# Patient Record
Sex: MALE | Race: WHITE | Employment: UNEMPLOYED | URBAN - METROPOLITAN AREA
[De-identification: names, ages, dates, MRNs, and addresses within clinical notes are randomized per-mention and may not be internally consistent; named-entity substitution may affect disease eponyms.]

---

## 2022-08-04 ENCOUNTER — HOSPITAL ENCOUNTER (EMERGENCY)
Age: 47
Discharge: HOME | End: 2022-08-04
Attending: EMERGENCY MEDICINE
Payer: COMMERCIAL

## 2022-08-04 ENCOUNTER — APPOINTMENT (OUTPATIENT)
Dept: GENERAL RADIOLOGY | Age: 47
End: 2022-08-04
Payer: COMMERCIAL

## 2022-08-04 VITALS
TEMPERATURE: 98.2 F | SYSTOLIC BLOOD PRESSURE: 124 MMHG | RESPIRATION RATE: 16 BRPM | DIASTOLIC BLOOD PRESSURE: 71 MMHG | OXYGEN SATURATION: 100 % | HEART RATE: 82 BPM | BODY MASS INDEX: 27.08 KG/M2 | WEIGHT: 205.25 LBS

## 2022-08-04 DIAGNOSIS — E86.0 DEHYDRATION: ICD-10-CM

## 2022-08-04 DIAGNOSIS — F17.200 CURRENT SMOKER: ICD-10-CM

## 2022-08-04 DIAGNOSIS — R53.83 MALAISE AND FATIGUE: ICD-10-CM

## 2022-08-04 DIAGNOSIS — Z20.822 COVID-19 VIRUS TEST RESULT UNKNOWN: ICD-10-CM

## 2022-08-04 DIAGNOSIS — R07.89 CHEST WALL PAIN: Primary | ICD-10-CM

## 2022-08-04 DIAGNOSIS — R53.81 MALAISE AND FATIGUE: ICD-10-CM

## 2022-08-04 LAB
A/G RATIO: 1.4 (ref 1.1–2.2)
ALBUMIN SERPL-MCNC: 4.1 G/DL (ref 3.4–5)
ALP BLD-CCNC: 63 U/L (ref 40–129)
ALT SERPL-CCNC: 24 U/L (ref 10–40)
ANION GAP SERPL CALCULATED.3IONS-SCNC: 12 MMOL/L (ref 3–16)
AST SERPL-CCNC: 18 U/L (ref 15–37)
BASOPHILS ABSOLUTE: 0 K/UL (ref 0–0.2)
BASOPHILS RELATIVE PERCENT: 0.6 %
BILIRUB SERPL-MCNC: 0.5 MG/DL (ref 0–1)
BUN BLDV-MCNC: 12 MG/DL (ref 7–20)
CALCIUM SERPL-MCNC: 9.2 MG/DL (ref 8.3–10.6)
CHLORIDE BLD-SCNC: 103 MMOL/L (ref 99–110)
CO2: 20 MMOL/L (ref 21–32)
CREAT SERPL-MCNC: 1 MG/DL (ref 0.9–1.3)
EKG ATRIAL RATE: 77 BPM
EKG DIAGNOSIS: NORMAL
EKG P AXIS: 29 DEGREES
EKG P-R INTERVAL: 126 MS
EKG Q-T INTERVAL: 352 MS
EKG QRS DURATION: 86 MS
EKG QTC CALCULATION (BAZETT): 398 MS
EKG R AXIS: 21 DEGREES
EKG T AXIS: 55 DEGREES
EKG VENTRICULAR RATE: 77 BPM
EOSINOPHILS ABSOLUTE: 0 K/UL (ref 0–0.6)
EOSINOPHILS RELATIVE PERCENT: 0.4 %
GFR AFRICAN AMERICAN: >60
GFR NON-AFRICAN AMERICAN: >60
GLUCOSE BLD-MCNC: 113 MG/DL (ref 70–99)
HCT VFR BLD CALC: 44 % (ref 40.5–52.5)
HEMOGLOBIN: 14.9 G/DL (ref 13.5–17.5)
LYMPHOCYTES ABSOLUTE: 2.5 K/UL (ref 1–5.1)
LYMPHOCYTES RELATIVE PERCENT: 35 %
MCH RBC QN AUTO: 29.4 PG (ref 26–34)
MCHC RBC AUTO-ENTMCNC: 34 G/DL (ref 31–36)
MCV RBC AUTO: 86.5 FL (ref 80–100)
MONOCYTES ABSOLUTE: 0.5 K/UL (ref 0–1.3)
MONOCYTES RELATIVE PERCENT: 7.4 %
NEUTROPHILS ABSOLUTE: 4 K/UL (ref 1.7–7.7)
NEUTROPHILS RELATIVE PERCENT: 56.6 %
PDW BLD-RTO: 13.5 % (ref 12.4–15.4)
PLATELET # BLD: 171 K/UL (ref 135–450)
PMV BLD AUTO: 9.3 FL (ref 5–10.5)
POTASSIUM REFLEX MAGNESIUM: 4 MMOL/L (ref 3.5–5.1)
RBC # BLD: 5.08 M/UL (ref 4.2–5.9)
SODIUM BLD-SCNC: 135 MMOL/L (ref 136–145)
TOTAL PROTEIN: 7.1 G/DL (ref 6.4–8.2)
TROPONIN: <0.01 NG/ML
WBC # BLD: 7 K/UL (ref 4–11)

## 2022-08-04 PROCEDURE — 71046 X-RAY EXAM CHEST 2 VIEWS: CPT

## 2022-08-04 PROCEDURE — 6370000000 HC RX 637 (ALT 250 FOR IP): Performed by: EMERGENCY MEDICINE

## 2022-08-04 PROCEDURE — 71045 X-RAY EXAM CHEST 1 VIEW: CPT

## 2022-08-04 PROCEDURE — U0003 INFECTIOUS AGENT DETECTION BY NUCLEIC ACID (DNA OR RNA); SEVERE ACUTE RESPIRATORY SYNDROME CORONAVIRUS 2 (SARS-COV-2) (CORONAVIRUS DISEASE [COVID-19]), AMPLIFIED PROBE TECHNIQUE, MAKING USE OF HIGH THROUGHPUT TECHNOLOGIES AS DESCRIBED BY CMS-2020-01-R: HCPCS

## 2022-08-04 PROCEDURE — 93005 ELECTROCARDIOGRAM TRACING: CPT | Performed by: EMERGENCY MEDICINE

## 2022-08-04 PROCEDURE — 85025 COMPLETE CBC W/AUTO DIFF WBC: CPT

## 2022-08-04 PROCEDURE — 93010 ELECTROCARDIOGRAM REPORT: CPT | Performed by: INTERNAL MEDICINE

## 2022-08-04 PROCEDURE — U0005 INFEC AGEN DETEC AMPLI PROBE: HCPCS

## 2022-08-04 PROCEDURE — 80053 COMPREHEN METABOLIC PANEL: CPT

## 2022-08-04 PROCEDURE — 84484 ASSAY OF TROPONIN QUANT: CPT

## 2022-08-04 PROCEDURE — 99285 EMERGENCY DEPT VISIT HI MDM: CPT

## 2022-08-04 RX ORDER — ACETAMINOPHEN 325 MG/1
650 TABLET ORAL EVERY 4 HOURS PRN
Qty: 60 TABLET | Refills: 1 | Status: SHIPPED | OUTPATIENT
Start: 2022-08-04

## 2022-08-04 RX ORDER — IBUPROFEN 400 MG/1
400 TABLET ORAL ONCE
Status: COMPLETED | OUTPATIENT
Start: 2022-08-04 | End: 2022-08-04

## 2022-08-04 RX ORDER — ACETAMINOPHEN 325 MG/1
650 TABLET ORAL ONCE
Status: COMPLETED | OUTPATIENT
Start: 2022-08-04 | End: 2022-08-04

## 2022-08-04 RX ORDER — IBUPROFEN 400 MG/1
400 TABLET ORAL EVERY 4 HOURS PRN
Qty: 60 TABLET | Refills: 1 | Status: SHIPPED | OUTPATIENT
Start: 2022-08-04

## 2022-08-04 RX ADMIN — ACETAMINOPHEN 650 MG: 325 TABLET ORAL at 12:08

## 2022-08-04 RX ADMIN — IBUPROFEN 400 MG: 400 TABLET, FILM COATED ORAL at 12:08

## 2022-08-04 RX ADMIN — LIDOCAINE HYDROCHLORIDE: 20 SOLUTION ORAL; TOPICAL at 12:10

## 2022-08-04 ASSESSMENT — PAIN SCALES - GENERAL: PAINLEVEL_OUTOF10: 9

## 2022-08-04 ASSESSMENT — PAIN - FUNCTIONAL ASSESSMENT: PAIN_FUNCTIONAL_ASSESSMENT: NONE - DENIES PAIN

## 2022-08-04 NOTE — ED PROVIDER NOTES
[address] [facility]      Pt Name: Mono Winslow  MRN: [id number]  [place] 1975  Date of evaluation: 8/4/2022  Provider: Nahun Corado MD    CHIEF COMPLAINT     Chest pain  HISTORY OF PRESENT ILLNESS  (Location/Symptom, Timing/Onset,Context/Setting, Quality, Duration, Modifying Factors, Severity). Note limiting factors. Chief Complaint   Patient presents with    Chest Pain     Started at 0500 today       Mono Winslow is a 41 y.o. male who presents to the emergency department secondary to concern for chest pain. He states that started around 5 AM and he thought it was indigestion. He has not taken any medication for it. It is located in his lower chest, does not radiate anywhere. He reports he has not been feeling well for the last few days ago did not have any discomfort until today in his upper abdomen/lower chest.  No known sick contacts however he does work as an  and sees other people. He has not been vaccinated for COVID. Denies any nausea, vomiting, fever, chills, trouble breathing currently though did have some nausea last night. He does smoke e-cigarettes. Past medical history noted below. He does not currently have a primary care though he is interested in having 1 aside from what is stated above denies any other symptoms or modifying factors. Nursing Notes reviewed. REVIEW OF SYSTEMS  (2-9 systems for level 4, 10 or more for level 5)   Review of Systems  Pertinent positive and negative findings as documented in the HPI; otherwise all other systems were reviewed and were negative.    PAST MEDICAL HISTORY     Past Medical History:   Diagnosis Date    Pneumonia        SURGICALHISTORY       CURRENT MEDICATIONS       Discharge Medication List as of 8/4/2022  2:28 PM        CONTINUE these medications which have NOT CHANGED    Details   naproxen (NAPROSYN) 500 MG tablet Take 1 tablet by mouth 2 times daily for 14 doses One tab twice a day when necessary pain, Disp-14 tablet, R-0Print            ALLERGIES     Codeine  FAMILY HISTORY     History reviewed. No pertinent family history. SOCIAL HISTORY       Social History     Socioeconomic History    Marital status:      Spouse name: None    Number of children: None    Years of education: None    Highest education level: None   Tobacco Use    Smoking status: Former     Types: Cigarettes     Quit date: 1990     Years since quittin.6   Substance and Sexual Activity    Alcohol use: Yes     Comment: seldom    Drug use: No    Sexual activity: Yes     Partners: Female   Social History Narrative    10/12/2009    Past Medical History: hypertriglyeridemia, up cholesterol, dyslipidemia, DDD(desistation), sliding Hiatal    hernia, chronic leg pain and cramps, hypoglycemia,    Surgical History: NONE    Family History: Father - DM2, CHF, murmur, PAD amputee    PGF - PAD    Aunt - (P) seizures    Social History: Marital Status: single, Children: 4, Employment Status: employed full-time,    Occupation: ,     Patient Lives at: home, Support System: adequate    Exercise: 3x Week, Caffeine per Day: 2    Seatbelt Use: 100 % of the time    Alcohol Use: socially    Drug Use: marijuana    Patient counseled on stopping drug abuse. experiment MJ and speed. Tobacco Usage:prior smoker    Cigarettes-Year Quit:      SCREENINGS    [place]iane Coma Scale  Eye Opening: Spontaneous  Best Verbal Response: Oriented  Best Motor Response: Obeys commands  [place] Coma Scale Score: 15    PHYSICAL EXAM  (up to 7 for level 4, 8 or more for level 5)   INITIAL VITALS: BP: 121/75, Temp: 97.8 °F (36.6 °C), Heart Rate: 83, Resp: 16, SpO2: 100 %   Physical Exam  Constitutional:       Appearance: He is ill-appearing (appears to feel unwell). He is not toxic-appearing or diaphoretic. HENT:      Head: Normocephalic and atraumatic.       Right Ear: External ear normal.      Left Ear: External ear normal.   Eyes:      General: No scleral icterus. Right eye: No discharge. Left eye: No discharge. Extraocular Movements: Extraocular movements intact. Conjunctiva/sclera: Conjunctivae normal.      Pupils: Pupils are equal, round, and reactive to light. Neck:      Trachea: No tracheal deviation. Cardiovascular:      Rate and Rhythm: Normal rate. Pulses: Normal pulses. Heart sounds: No murmur heard. Pulmonary:      Effort: Pulmonary effort is normal. No respiratory distress. Breath sounds: No wheezing, rhonchi or rales. Chest:      Chest wall: No tenderness. Abdominal:      Tenderness: There is no abdominal tenderness. There is no guarding or rebound. Musculoskeletal:      Cervical back: Normal range of motion. Right lower leg: No edema. Left lower leg: No edema. Skin:     General: Skin is dry. Capillary Refill: Capillary refill takes 2 to 3 seconds. Neurological:      General: No focal deficit present. Mental Status: He is alert and oriented to person, place, and time. Gait: Gait normal.   Psychiatric:         Mood and Affect: Mood normal.         Behavior: Behavior normal.     DIAGNOSTIC RESULTS   EKG: interpreted by the Emergency Department Physician who either signs or Co-signs this chart in the absence of a cardiologist.  Indication: chest pain  Interpretation: 97, rhythm sinus, axis normal.  VA/QRS/QTc within normal limits. No T/ST changes consistent with acute ischemia. No prior EKG is available for comparison. RADIOLOGY:   Interpretation per Radiologist below, if available at the time of this note:  XR CHEST 1 VIEW   Final Result   No acute abnormality         XR CHEST (2 VW)   Final Result   1. No acute airspace consolidation. 2. Apparent 8 mm nodular opacity overlying the right lung base, which could   represent the patient's right nipple shadow. However, a pulmonary nodule is   also a consideration. Suggest at least a repeat PA view of the chest with   nipple markers in place to exclude a pulmonary nodule. Alternatively, a   chest CT without contrast could also be performed for further evaluation. LABS:  Labs Reviewed   COMPREHENSIVE METABOLIC PANEL W/ REFLEX TO MG FOR LOW K - Abnormal; Notable for the following components:       Result Value    Sodium 135 (*)     CO2 20 (*)     Glucose 113 (*)     All other components within normal limits   TROPONIN   CBC WITH AUTO DIFFERENTIAL   COVID-19       EMERGENCY DEPARTMENT COURSE and DIFFERENTIAL DIAGNOSIS/MDM:   Patient was given the following medications:  Orders Placed This Encounter   Medications    ibuprofen (ADVIL;MOTRIN) tablet 400 mg    acetaminophen (TYLENOL) tablet 650 mg    aluminum & magnesium hydroxide-simethicone (MAALOX) 30 mL, lidocaine viscous hcl (XYLOCAINE) 5 mL (GI COCKTAIL)    ibuprofen (ADVIL;MOTRIN) 400 MG tablet     Sig: Take 1 tablet by mouth every 4 hours as needed for Pain or Fever     Dispense:  60 tablet     Refill:  1    acetaminophen (TYLENOL) 325 MG tablet     Sig: Take 2 tablets by mouth every 4 hours as needed for Pain or Fever     Dispense:  60 tablet     Refill:  1     CONSULTS:  None    INITIAL VITALS: BP: 121/75, Temp: 97.8 °F (36.6 °C), Heart Rate: 83, Resp: 16, SpO2: 100 %   RECENT VITALS:  BP: 124/71,Temp: 98.2 °F (36.8 °C), Heart Rate: 82, Resp: 16, SpO2: 100 %     Is this patient to be included in the SEP-1 Core Measure due to severe sepsis or septic shock? No   Exclusion criteria - the patient is NOT to be included for SEP-1 Core Measure due to:  2+ SIRS criteria are not met    Debi Josue is a 41 y.o. male who presents to the emergency department secondary to concern for symptoms as noted in HPI. On arrival he is awake, alert, oriented. Vitals are hemodynamically stable. His physical exam is largely reassuring as noted above. Ordered medications in addition to a big gulp.       Labs and imaging are reassuring. On reassessment we discussed results of his labs, imaging, continued isolation/quarantine while waiting for the COVID results. Discussed potential etiology related to something viral given he has been feeling unwell for a few days and then not eating or drinking well which may have caused some indigestion-like symptoms. He did feel better at that time after the medications. Discussed primary care referral.  I also counseled him face-to-face for 3 minutes on smoking cessation and the risks of smoking including even cigarettes. He expressed understanding of all instructions, was in agreement with plan, and he was discharged home in stable condition. FINAL IMPRESSION      1. Chest wall pain    2. Malaise and fatigue    3. COVID-19 virus test result unknown    4. Dehydration    5.  Current smoker        DISPOSITION/PLAN   DISPOSITION Decision To Discharge 08/04/2022 02:25:30 PM      PATIENT REFERRED TO:  Shaista Serna  [phone]  Call   For follow up appointment to set up primary care      DISCHARGE MEDICATIONS:  Discharge Medication List as of 8/4/2022  2:28 PM        START taking these medications    Details   ibuprofen (ADVIL;MOTRIN) 400 MG tablet Take 1 tablet by mouth every 4 hours as needed for Pain or Fever, Disp-60 tablet, R-1Print      acetaminophen (TYLENOL) 325 MG tablet Take 2 tablets by mouth every 4 hours as needed for Pain or Fever, Disp-60 tablet, R-1Print                  (Please note that portions of this note were completed with a voice recognition program. Efforts were made to edit the dictations but occasionally words are mis-transcribed.)    Daisy Pelletier MD (electronically signed)  Attending Emergency Physician     Daisy Pelletier MD  08/04/22 [id number]

## 2022-08-04 NOTE — DISCHARGE INSTRUCTIONS
Your chest xray today showed no signs of infection. Your labs showed you were slightly dehydrated. You probably have a viral illness causing your symptoms, it may be COVID. Your Covid test result is pending. We will call you if the result is positive and you can also follow up the result online on [employer]. If the result is positive it will say \"DETECTED\" on [employer]fish. Continue to isolate/quarantine while waiting for results. Take over the counter medications like NSAIDs, tylenol, sudafed for symptoms of body aches, congestion, fevers. We have sent ibuprofen and tylenol to the pharmacy. Follow up with primary care as needed. Return to ED for any new or worsening symptoms or concerns.

## 2022-08-04 NOTE — Clinical Note
Valentina Crane was seen and treated in our emergency department on 8/4/2022. He may return to work on 08/13/2022. If COVID test is negative can return to work with improvement in symptoms x24 hours. If COVID test is positive will need to continue to isolate/quarantine through August 13, 2022. If you have any questions or concerns, please don't hesitate to call.       Debora Perez MD

## 2022-08-05 LAB — SARS-COV-2, PCR: NOT DETECTED
